# Patient Record
Sex: MALE
[De-identification: names, ages, dates, MRNs, and addresses within clinical notes are randomized per-mention and may not be internally consistent; named-entity substitution may affect disease eponyms.]

---

## 2018-09-03 ENCOUNTER — HOSPITAL ENCOUNTER (EMERGENCY)
Dept: HOSPITAL 12 - ER | Age: 63
Discharge: HOME | End: 2018-09-03
Payer: COMMERCIAL

## 2018-09-03 VITALS — HEIGHT: 69 IN | BODY MASS INDEX: 23.7 KG/M2 | WEIGHT: 160 LBS

## 2018-09-03 VITALS — SYSTOLIC BLOOD PRESSURE: 135 MMHG | DIASTOLIC BLOOD PRESSURE: 92 MMHG

## 2018-09-03 DIAGNOSIS — Z90.49: ICD-10-CM

## 2018-09-03 DIAGNOSIS — R10.31: Primary | ICD-10-CM

## 2018-09-03 LAB
ALP SERPL-CCNC: 50 U/L (ref 50–136)
ALT SERPL W/O P-5'-P-CCNC: 17 U/L (ref 16–63)
APPEARANCE UR: CLEAR
AST SERPL-CCNC: 10 U/L (ref 15–37)
BASOPHILS # BLD AUTO: 0 K/UL (ref 0–8)
BASOPHILS NFR BLD AUTO: 0.5 % (ref 0–2)
BILIRUB DIRECT SERPL-MCNC: 0.1 MG/DL (ref 0–0.2)
BILIRUB SERPL-MCNC: 0.5 MG/DL (ref 0.2–1)
BILIRUB UR QL STRIP: NEGATIVE
BUN SERPL-MCNC: 16 MG/DL (ref 7–18)
CHLORIDE SERPL-SCNC: 105 MMOL/L (ref 98–107)
CO2 SERPL-SCNC: 28 MMOL/L (ref 21–32)
COLOR UR: YELLOW
CREAT SERPL-MCNC: 1.1 MG/DL (ref 0.6–1.3)
DEPRECATED SQUAMOUS URNS QL MICRO: (no result) /HPF
EOSINOPHIL # BLD AUTO: 0.3 K/UL (ref 0–0.7)
EOSINOPHIL NFR BLD AUTO: 4.7 % (ref 0–7)
GLUCOSE SERPL-MCNC: 100 MG/DL (ref 74–106)
GLUCOSE UR STRIP-MCNC: NEGATIVE MG/DL
HCT VFR BLD AUTO: 43.6 % (ref 36.7–47.1)
HGB BLD-MCNC: 15.1 G/DL (ref 12.5–16.3)
HGB UR QL STRIP: NEGATIVE
KETONES UR STRIP-MCNC: NEGATIVE MG/DL
LEUKOCYTE ESTERASE UR QL STRIP: NEGATIVE
LYMPHOCYTES # BLD AUTO: 1.8 K/UL (ref 20–40)
LYMPHOCYTES NFR BLD AUTO: 32.7 % (ref 20.5–51.5)
MCH RBC QN AUTO: 31.2 UUG (ref 23.8–33.4)
MCHC RBC AUTO-ENTMCNC: 35 G/DL (ref 32.5–36.3)
MCV RBC AUTO: 90.2 FL (ref 73–96.2)
MONOCYTES # BLD AUTO: 0.3 K/UL (ref 2–10)
MONOCYTES NFR BLD AUTO: 5.9 % (ref 0–11)
NEUTROPHILS # BLD AUTO: 3.2 K/UL (ref 1.8–8.9)
NEUTROPHILS NFR BLD AUTO: 56.2 % (ref 38.5–71.5)
NITRITE UR QL STRIP: NEGATIVE
PH UR STRIP: 6 [PH] (ref 5–8)
PLATELET # BLD AUTO: 188 K/UL (ref 152–348)
POTASSIUM SERPL-SCNC: 4.1 MMOL/L (ref 3.5–5.1)
PROT UR QL STRIP: NEGATIVE
RBC # BLD AUTO: 4.83 MIL/UL (ref 4.06–5.63)
RBC #/AREA URNS HPF: (no result) /HPF (ref 0–3)
SP GR UR STRIP: >=1.03 (ref 1–1.03)
UROBILINOGEN UR STRIP-MCNC: 0.2 E.U./DL
WBC # BLD AUTO: 5.6 K/UL (ref 3.6–10.2)
WBC #/AREA URNS HPF: (no result) /HPF
WBC #/AREA URNS HPF: (no result) /HPF (ref 0–3)
WS STN SPEC: 7.5 G/DL (ref 6.4–8.2)

## 2018-09-03 PROCEDURE — A4663 DIALYSIS BLOOD PRESSURE CUFF: HCPCS

## 2018-09-03 NOTE — NUR
Patient discharged to home in stable conditon.  Patient reported being pain 
free prior to discharge. Written and verbal after care instructions given. 
Patient verbalizes understanding of instructions. Patient able to ambulate 
unassisted with a steady gait. Patient left with all personal belongings.